# Patient Record
Sex: FEMALE | Race: WHITE | URBAN - METROPOLITAN AREA
[De-identification: names, ages, dates, MRNs, and addresses within clinical notes are randomized per-mention and may not be internally consistent; named-entity substitution may affect disease eponyms.]

---

## 2019-05-21 ENCOUNTER — Encounter (OUTPATIENT)
Dept: URBAN - METROPOLITAN AREA CLINIC 72 | Facility: CLINIC | Age: 84
End: 2019-05-21
Payer: MEDICARE

## 2019-05-21 PROCEDURE — 99204 OFFICE O/P NEW MOD 45 MIN: CPT | Performed by: OPTOMETRIST

## 2021-04-29 ENCOUNTER — OFFICE VISIT (OUTPATIENT)
Dept: URBAN - METROPOLITAN AREA CLINIC 72 | Facility: CLINIC | Age: 86
End: 2021-04-29
Payer: MEDICARE

## 2021-04-29 DIAGNOSIS — H52.4 PRESBYOPIA: ICD-10-CM

## 2021-04-29 DIAGNOSIS — H53.031 STRABISMIC AMBLYOPIA, RIGHT EYE: ICD-10-CM

## 2021-04-29 DIAGNOSIS — H43.813 VITREOUS DEGENERATION, BILATERAL: Primary | ICD-10-CM

## 2021-04-29 PROCEDURE — 99213 OFFICE O/P EST LOW 20 MIN: CPT | Performed by: OPTOMETRIST

## 2021-04-29 ASSESSMENT — INTRAOCULAR PRESSURE
OD: 17
OS: 17

## 2021-04-29 ASSESSMENT — VISUAL ACUITY: OS: 20/40

## 2021-04-29 NOTE — IMPRESSION/PLAN
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 Plan: Discussed diagnosis in detail with patient. No treatment is required at this time. Will continue to observe condition and or symptoms.

## 2023-10-24 ENCOUNTER — SURGERY (OUTPATIENT)
Dept: URBAN - METROPOLITAN AREA SURGERY 45 | Facility: SURGERY | Age: 88
End: 2023-10-24
Payer: MEDICARE

## 2023-10-24 ENCOUNTER — SURGERY (OUTPATIENT)
Dept: URBAN - METROPOLITAN AREA SURGERY 44 | Facility: SURGERY | Age: 88
End: 2023-10-24
Payer: MEDICARE

## 2023-10-24 PROCEDURE — 66821 AFTER CATARACT LASER SURGERY: CPT | Performed by: OPHTHALMOLOGY

## 2023-10-31 ENCOUNTER — SURGERY (OUTPATIENT)
Dept: URBAN - METROPOLITAN AREA SURGERY 44 | Facility: SURGERY | Age: 88
End: 2023-10-31
Payer: MEDICARE

## 2023-10-31 ENCOUNTER — SURGERY (OUTPATIENT)
Dept: URBAN - METROPOLITAN AREA SURGERY 45 | Facility: SURGERY | Age: 88
End: 2023-10-31
Payer: MEDICARE

## 2023-10-31 PROCEDURE — 66821 AFTER CATARACT LASER SURGERY: CPT | Performed by: OPHTHALMOLOGY

## 2023-11-09 ENCOUNTER — POST-OPERATIVE VISIT (OUTPATIENT)
Dept: URBAN - METROPOLITAN AREA CLINIC 71 | Facility: CLINIC | Age: 88
End: 2023-11-09
Payer: MEDICARE

## 2023-11-09 DIAGNOSIS — H52.4 PRESBYOPIA: ICD-10-CM

## 2023-11-09 DIAGNOSIS — Z48.810 ENCOUNTER FOR SURGICAL AFTERCARE FOLLOWING SURGERY ON A SENSE ORGAN: Primary | ICD-10-CM

## 2023-11-09 PROCEDURE — 99024 POSTOP FOLLOW-UP VISIT: CPT | Performed by: OPTOMETRIST

## 2023-11-09 PROCEDURE — 92015 DETERMINE REFRACTIVE STATE: CPT | Performed by: OPTOMETRIST

## 2023-11-09 ASSESSMENT — VISUAL ACUITY
OS: 20/25
OD: 20/25

## 2023-11-09 ASSESSMENT — INTRAOCULAR PRESSURE
OD: 15
OS: 23